# Patient Record
Sex: FEMALE | Race: WHITE | NOT HISPANIC OR LATINO | Employment: OTHER | URBAN - METROPOLITAN AREA
[De-identification: names, ages, dates, MRNs, and addresses within clinical notes are randomized per-mention and may not be internally consistent; named-entity substitution may affect disease eponyms.]

---

## 2017-05-17 ENCOUNTER — APPOINTMENT (OUTPATIENT)
Dept: GENERAL RADIOLOGY | Facility: HOSPITAL | Age: 77
End: 2017-05-17

## 2017-05-17 ENCOUNTER — HOSPITAL ENCOUNTER (OUTPATIENT)
Facility: HOSPITAL | Age: 77
Setting detail: OBSERVATION
LOS: 1 days | Discharge: HOME | End: 2017-05-18
Attending: EMERGENCY MEDICINE | Admitting: INTERNAL MEDICINE

## 2017-05-17 DIAGNOSIS — R07.9 CHEST PAIN, UNSPECIFIED TYPE: Primary | ICD-10-CM

## 2017-05-17 PROBLEM — E11.9 DIABETES MELLITUS TYPE 2, CONTROLLED (HCC): Status: ACTIVE | Noted: 2017-05-17

## 2017-05-17 PROBLEM — I10 ACCELERATED HYPERTENSION: Status: ACTIVE | Noted: 2017-05-17

## 2017-05-17 PROBLEM — E78.5 HYPERLIPIDEMIA: Status: ACTIVE | Noted: 2017-05-17

## 2017-05-17 PROBLEM — I10 HYPERTENSION: Status: ACTIVE | Noted: 2017-05-17

## 2017-05-17 LAB
ALBUMIN SERPL-MCNC: 4.8 G/DL (ref 3.2–4.8)
ALBUMIN/GLOB SERPL: 1.8 G/DL (ref 1.5–2.5)
ALP SERPL-CCNC: 147 U/L (ref 25–100)
ALT SERPL W P-5'-P-CCNC: 14 U/L (ref 7–40)
ANION GAP SERPL CALCULATED.3IONS-SCNC: 5 MMOL/L (ref 3–11)
AST SERPL-CCNC: 23 U/L (ref 0–33)
BASOPHILS # BLD AUTO: 0.02 10*3/MM3 (ref 0–0.2)
BASOPHILS NFR BLD AUTO: 0.2 % (ref 0–1)
BILIRUB SERPL-MCNC: 0.4 MG/DL (ref 0.3–1.2)
BNP SERPL-MCNC: 84 PG/ML (ref 0–100)
BUN BLD-MCNC: 14 MG/DL (ref 9–23)
BUN/CREAT SERPL: 23.3 (ref 7–25)
CALCIUM SPEC-SCNC: 10.3 MG/DL (ref 8.7–10.4)
CHLORIDE SERPL-SCNC: 106 MMOL/L (ref 99–109)
CO2 SERPL-SCNC: 28 MMOL/L (ref 20–31)
CREAT BLD-MCNC: 0.6 MG/DL (ref 0.6–1.3)
DEPRECATED RDW RBC AUTO: 43.7 FL (ref 37–54)
EOSINOPHIL # BLD AUTO: 0.08 10*3/MM3 (ref 0.1–0.3)
EOSINOPHIL NFR BLD AUTO: 1 % (ref 0–3)
ERYTHROCYTE [DISTWIDTH] IN BLOOD BY AUTOMATED COUNT: 13 % (ref 11.3–14.5)
GFR SERPL CREATININE-BSD FRML MDRD: 97 ML/MIN/1.73
GLOBULIN UR ELPH-MCNC: 2.7 GM/DL
GLUCOSE BLD-MCNC: 152 MG/DL (ref 70–100)
GLUCOSE BLDC GLUCOMTR-MCNC: 191 MG/DL (ref 70–130)
HCT VFR BLD AUTO: 43.8 % (ref 34.5–44)
HGB BLD-MCNC: 14.5 G/DL (ref 11.5–15.5)
HOLD SPECIMEN: NORMAL
HOLD SPECIMEN: NORMAL
IMM GRANULOCYTES # BLD: 0.01 10*3/MM3 (ref 0–0.03)
IMM GRANULOCYTES NFR BLD: 0.1 % (ref 0–0.6)
LIPASE SERPL-CCNC: 44 U/L (ref 6–51)
LYMPHOCYTES # BLD AUTO: 2.27 10*3/MM3 (ref 0.6–4.8)
LYMPHOCYTES NFR BLD AUTO: 28.1 % (ref 24–44)
MCH RBC QN AUTO: 30.4 PG (ref 27–31)
MCHC RBC AUTO-ENTMCNC: 33.1 G/DL (ref 32–36)
MCV RBC AUTO: 91.8 FL (ref 80–99)
MONOCYTES # BLD AUTO: 0.31 10*3/MM3 (ref 0–1)
MONOCYTES NFR BLD AUTO: 3.8 % (ref 0–12)
NEUTROPHILS # BLD AUTO: 5.38 10*3/MM3 (ref 1.5–8.3)
NEUTROPHILS NFR BLD AUTO: 66.8 % (ref 41–71)
PLATELET # BLD AUTO: 259 10*3/MM3 (ref 150–450)
PMV BLD AUTO: 10.8 FL (ref 6–12)
POTASSIUM BLD-SCNC: 4.1 MMOL/L (ref 3.5–5.5)
PROT SERPL-MCNC: 7.5 G/DL (ref 5.7–8.2)
RBC # BLD AUTO: 4.77 10*6/MM3 (ref 3.89–5.14)
SODIUM BLD-SCNC: 139 MMOL/L (ref 132–146)
TROPONIN I SERPL-MCNC: 0 NG/ML (ref 0–0.07)
WBC NRBC COR # BLD: 8.07 10*3/MM3 (ref 3.5–10.8)
WHOLE BLOOD HOLD SPECIMEN: NORMAL
WHOLE BLOOD HOLD SPECIMEN: NORMAL

## 2017-05-17 PROCEDURE — 99220 PR INITIAL OBSERVATION CARE/DAY 70 MINUTES: CPT | Performed by: HOSPITALIST

## 2017-05-17 PROCEDURE — 93005 ELECTROCARDIOGRAM TRACING: CPT

## 2017-05-17 PROCEDURE — 82962 GLUCOSE BLOOD TEST: CPT

## 2017-05-17 PROCEDURE — G0378 HOSPITAL OBSERVATION PER HR: HCPCS

## 2017-05-17 PROCEDURE — 80053 COMPREHEN METABOLIC PANEL: CPT | Performed by: EMERGENCY MEDICINE

## 2017-05-17 PROCEDURE — 83690 ASSAY OF LIPASE: CPT | Performed by: EMERGENCY MEDICINE

## 2017-05-17 PROCEDURE — 99285 EMERGENCY DEPT VISIT HI MDM: CPT

## 2017-05-17 PROCEDURE — 93005 ELECTROCARDIOGRAM TRACING: CPT | Performed by: HOSPITALIST

## 2017-05-17 PROCEDURE — 71010 HC CHEST PA OR AP: CPT

## 2017-05-17 PROCEDURE — 84484 ASSAY OF TROPONIN QUANT: CPT | Performed by: PHYSICIAN ASSISTANT

## 2017-05-17 PROCEDURE — 85025 COMPLETE CBC W/AUTO DIFF WBC: CPT | Performed by: EMERGENCY MEDICINE

## 2017-05-17 PROCEDURE — 84484 ASSAY OF TROPONIN QUANT: CPT

## 2017-05-17 PROCEDURE — 83880 ASSAY OF NATRIURETIC PEPTIDE: CPT | Performed by: EMERGENCY MEDICINE

## 2017-05-17 PROCEDURE — 63710000001 INSULIN LISPRO (HUMAN) PER 5 UNITS: Performed by: PHYSICIAN ASSISTANT

## 2017-05-17 RX ORDER — DOCUSATE SODIUM 100 MG/1
100 CAPSULE, LIQUID FILLED ORAL 2 TIMES DAILY
Status: DISCONTINUED | OUTPATIENT
Start: 2017-05-18 | End: 2017-05-18 | Stop reason: HOSPADM

## 2017-05-17 RX ORDER — SODIUM CHLORIDE 0.9 % (FLUSH) 0.9 %
1-10 SYRINGE (ML) INJECTION AS NEEDED
Status: DISCONTINUED | OUTPATIENT
Start: 2017-05-17 | End: 2017-05-18 | Stop reason: HOSPADM

## 2017-05-17 RX ORDER — AMOXICILLIN 500 MG
1200 CAPSULE ORAL DAILY
COMMUNITY

## 2017-05-17 RX ORDER — SODIUM CHLORIDE 0.9 % (FLUSH) 0.9 %
10 SYRINGE (ML) INJECTION AS NEEDED
Status: DISCONTINUED | OUTPATIENT
Start: 2017-05-17 | End: 2017-05-18 | Stop reason: HOSPADM

## 2017-05-17 RX ORDER — GLYBURIDE 1.25 MG/1
1.25 TABLET ORAL 2 TIMES DAILY WITH MEALS
COMMUNITY

## 2017-05-17 RX ORDER — NICOTINE POLACRILEX 4 MG
15 LOZENGE BUCCAL
Status: DISCONTINUED | OUTPATIENT
Start: 2017-05-17 | End: 2017-05-18 | Stop reason: HOSPADM

## 2017-05-17 RX ORDER — ASPIRIN 325 MG
162 TABLET ORAL ONCE
Status: COMPLETED | OUTPATIENT
Start: 2017-05-17 | End: 2017-05-17

## 2017-05-17 RX ORDER — PRAVASTATIN SODIUM 40 MG
80 TABLET ORAL NIGHTLY
Status: DISCONTINUED | OUTPATIENT
Start: 2017-05-17 | End: 2017-05-18 | Stop reason: HOSPADM

## 2017-05-17 RX ORDER — CARVEDILOL 3.12 MG/1
3.12 TABLET ORAL 2 TIMES DAILY
Status: DISCONTINUED | OUTPATIENT
Start: 2017-05-17 | End: 2017-05-18 | Stop reason: HOSPADM

## 2017-05-17 RX ORDER — ONDANSETRON 2 MG/ML
4 INJECTION INTRAMUSCULAR; INTRAVENOUS EVERY 6 HOURS PRN
Status: DISCONTINUED | OUTPATIENT
Start: 2017-05-17 | End: 2017-05-18 | Stop reason: HOSPADM

## 2017-05-17 RX ORDER — ASPIRIN 81 MG/1
324 TABLET, CHEWABLE ORAL ONCE
Status: DISCONTINUED | OUTPATIENT
Start: 2017-05-17 | End: 2017-05-18 | Stop reason: HOSPADM

## 2017-05-17 RX ORDER — ASPIRIN 81 MG/1
324 TABLET, CHEWABLE ORAL ONCE
Status: DISCONTINUED | OUTPATIENT
Start: 2017-05-17 | End: 2017-05-17

## 2017-05-17 RX ORDER — LISINOPRIL 2.5 MG/1
2.5 TABLET ORAL DAILY
COMMUNITY

## 2017-05-17 RX ORDER — ACETAMINOPHEN 325 MG/1
650 TABLET ORAL EVERY 4 HOURS PRN
Status: DISCONTINUED | OUTPATIENT
Start: 2017-05-17 | End: 2017-05-18 | Stop reason: HOSPADM

## 2017-05-17 RX ORDER — HEPARIN SODIUM 5000 [USP'U]/ML
5000 INJECTION, SOLUTION INTRAVENOUS; SUBCUTANEOUS EVERY 8 HOURS SCHEDULED
Status: DISCONTINUED | OUTPATIENT
Start: 2017-05-18 | End: 2017-05-18 | Stop reason: HOSPADM

## 2017-05-17 RX ORDER — ASPIRIN 81 MG/1
81 TABLET ORAL DAILY
COMMUNITY

## 2017-05-17 RX ORDER — DEXTROSE MONOHYDRATE 25 G/50ML
25 INJECTION, SOLUTION INTRAVENOUS
Status: DISCONTINUED | OUTPATIENT
Start: 2017-05-17 | End: 2017-05-18 | Stop reason: HOSPADM

## 2017-05-17 RX ORDER — ONDANSETRON 4 MG/1
4 TABLET, FILM COATED ORAL EVERY 6 HOURS PRN
Status: DISCONTINUED | OUTPATIENT
Start: 2017-05-17 | End: 2017-05-18 | Stop reason: HOSPADM

## 2017-05-17 RX ORDER — ASPIRIN 81 MG/1
81 TABLET ORAL DAILY
Status: DISCONTINUED | OUTPATIENT
Start: 2017-05-18 | End: 2017-05-18 | Stop reason: HOSPADM

## 2017-05-17 RX ORDER — MECLIZINE HYDROCHLORIDE 25 MG/1
25 TABLET ORAL 3 TIMES DAILY PRN
COMMUNITY

## 2017-05-17 RX ADMIN — ASPIRIN 325 MG ORAL TABLET 162 MG: 325 PILL ORAL at 19:24

## 2017-05-17 RX ADMIN — PRAVASTATIN SODIUM 80 MG: 40 TABLET ORAL at 22:41

## 2017-05-17 RX ADMIN — INSULIN LISPRO 2 UNITS: 100 INJECTION, SOLUTION INTRAVENOUS; SUBCUTANEOUS at 22:41

## 2017-05-17 RX ADMIN — CARVEDILOL 3.12 MG: 3.12 TABLET, FILM COATED ORAL at 22:41

## 2017-05-18 ENCOUNTER — APPOINTMENT (OUTPATIENT)
Dept: CARDIOLOGY | Facility: HOSPITAL | Age: 77
End: 2017-05-18

## 2017-05-18 VITALS
DIASTOLIC BLOOD PRESSURE: 66 MMHG | OXYGEN SATURATION: 95 % | BODY MASS INDEX: 25.87 KG/M2 | HEIGHT: 63 IN | SYSTOLIC BLOOD PRESSURE: 139 MMHG | TEMPERATURE: 97.7 F | WEIGHT: 146 LBS | RESPIRATION RATE: 15 BRPM | HEART RATE: 79 BPM

## 2017-05-18 PROBLEM — I10 ACCELERATED HYPERTENSION: Status: RESOLVED | Noted: 2017-05-17 | Resolved: 2017-05-18

## 2017-05-18 PROBLEM — R07.9 CHEST PAIN: Status: RESOLVED | Noted: 2017-05-17 | Resolved: 2017-05-18

## 2017-05-18 LAB
ANION GAP SERPL CALCULATED.3IONS-SCNC: 5 MMOL/L (ref 3–11)
ARTICHOKE IGE QN: 116 MG/DL (ref 0–130)
BH CV STRESS BP STAGE 1: NORMAL
BH CV STRESS BP STAGE 2: NORMAL
BH CV STRESS DURATION MIN STAGE 1: 3
BH CV STRESS DURATION MIN STAGE 2: 1
BH CV STRESS DURATION SEC STAGE 1: 0
BH CV STRESS DURATION SEC STAGE 2: 31
BH CV STRESS GRADE STAGE 1: 10
BH CV STRESS GRADE STAGE 2: 12
BH CV STRESS HR STAGE 1: 125
BH CV STRESS HR STAGE 2: 127
BH CV STRESS METS STAGE 1: 5
BH CV STRESS METS STAGE 2: 7.5
BH CV STRESS PROTOCOL 1: NORMAL
BH CV STRESS RECOVERY BP: NORMAL MMHG
BH CV STRESS RECOVERY HR: 75 BPM
BH CV STRESS SPEED STAGE 1: 1.7
BH CV STRESS SPEED STAGE 2: 2.5
BH CV STRESS STAGE 1: 1
BH CV STRESS STAGE 2: 2
BUN BLD-MCNC: 12 MG/DL (ref 9–23)
BUN/CREAT SERPL: 24 (ref 7–25)
CALCIUM SPEC-SCNC: 9.8 MG/DL (ref 8.7–10.4)
CHLORIDE SERPL-SCNC: 109 MMOL/L (ref 99–109)
CHOLEST SERPL-MCNC: 175 MG/DL (ref 0–200)
CO2 SERPL-SCNC: 28 MMOL/L (ref 20–31)
CREAT BLD-MCNC: 0.5 MG/DL (ref 0.6–1.3)
DEPRECATED RDW RBC AUTO: 43.9 FL (ref 37–54)
ERYTHROCYTE [DISTWIDTH] IN BLOOD BY AUTOMATED COUNT: 13 % (ref 11.3–14.5)
GFR SERPL CREATININE-BSD FRML MDRD: 120 ML/MIN/1.73
GLUCOSE BLD-MCNC: 110 MG/DL (ref 70–100)
GLUCOSE BLDC GLUCOMTR-MCNC: 122 MG/DL (ref 70–130)
GLUCOSE BLDC GLUCOMTR-MCNC: 198 MG/DL (ref 70–130)
GLUCOSE BLDC GLUCOMTR-MCNC: 99 MG/DL (ref 70–130)
HCT VFR BLD AUTO: 39.7 % (ref 34.5–44)
HDLC SERPL-MCNC: 31 MG/DL (ref 40–60)
HGB BLD-MCNC: 13 G/DL (ref 11.5–15.5)
LV EF NUC BP: 94 %
MAXIMAL PREDICTED HEART RATE: 144 BPM
MCH RBC QN AUTO: 30.2 PG (ref 27–31)
MCHC RBC AUTO-ENTMCNC: 32.7 G/DL (ref 32–36)
MCV RBC AUTO: 92.1 FL (ref 80–99)
PERCENT MAX PREDICTED HR: 89.58 %
PLATELET # BLD AUTO: 228 10*3/MM3 (ref 150–450)
PMV BLD AUTO: 10.7 FL (ref 6–12)
POTASSIUM BLD-SCNC: 3.6 MMOL/L (ref 3.5–5.5)
RBC # BLD AUTO: 4.31 10*6/MM3 (ref 3.89–5.14)
SODIUM BLD-SCNC: 142 MMOL/L (ref 132–146)
STRESS BASELINE BP: NORMAL MMHG
STRESS BASELINE HR: 71 BPM
STRESS PERCENT HR: 105 %
STRESS POST ESTIMATED WORKLOAD: 6.4 METS
STRESS POST EXERCISE DUR MIN: 4 MIN
STRESS POST EXERCISE DUR SEC: 31 SEC
STRESS POST PEAK BP: NORMAL MMHG
STRESS POST PEAK HR: 129 BPM
STRESS TARGET HR: 122 BPM
TRIGL SERPL-MCNC: 185 MG/DL (ref 0–150)
TROPONIN I SERPL-MCNC: <0.006 NG/ML
TROPONIN I SERPL-MCNC: <0.006 NG/ML
WBC NRBC COR # BLD: 6.32 10*3/MM3 (ref 3.5–10.8)

## 2017-05-18 PROCEDURE — 82962 GLUCOSE BLOOD TEST: CPT

## 2017-05-18 PROCEDURE — A9500 TC99M SESTAMIBI: HCPCS | Performed by: INTERNAL MEDICINE

## 2017-05-18 PROCEDURE — 93018 CV STRESS TEST I&R ONLY: CPT | Performed by: INTERNAL MEDICINE

## 2017-05-18 PROCEDURE — 93010 ELECTROCARDIOGRAM REPORT: CPT | Performed by: INTERNAL MEDICINE

## 2017-05-18 PROCEDURE — G0378 HOSPITAL OBSERVATION PER HR: HCPCS

## 2017-05-18 PROCEDURE — 84484 ASSAY OF TROPONIN QUANT: CPT | Performed by: PHYSICIAN ASSISTANT

## 2017-05-18 PROCEDURE — 80061 LIPID PANEL: CPT | Performed by: PHYSICIAN ASSISTANT

## 2017-05-18 PROCEDURE — 93005 ELECTROCARDIOGRAM TRACING: CPT | Performed by: PHYSICIAN ASSISTANT

## 2017-05-18 PROCEDURE — 85027 COMPLETE CBC AUTOMATED: CPT | Performed by: PHYSICIAN ASSISTANT

## 2017-05-18 PROCEDURE — 78452 HT MUSCLE IMAGE SPECT MULT: CPT

## 2017-05-18 PROCEDURE — 78452 HT MUSCLE IMAGE SPECT MULT: CPT | Performed by: INTERNAL MEDICINE

## 2017-05-18 PROCEDURE — 80048 BASIC METABOLIC PNL TOTAL CA: CPT | Performed by: PHYSICIAN ASSISTANT

## 2017-05-18 PROCEDURE — 25010000002 HEPARIN (PORCINE) PER 1000 UNITS: Performed by: PHYSICIAN ASSISTANT

## 2017-05-18 PROCEDURE — 96372 THER/PROPH/DIAG INJ SC/IM: CPT

## 2017-05-18 PROCEDURE — 0 TECHNETIUM SESTAMIBI: Performed by: INTERNAL MEDICINE

## 2017-05-18 PROCEDURE — 93017 CV STRESS TEST TRACING ONLY: CPT

## 2017-05-18 PROCEDURE — 99217 PR OBSERVATION CARE DISCHARGE MANAGEMENT: CPT | Performed by: INTERNAL MEDICINE

## 2017-05-18 RX ORDER — ATORVASTATIN CALCIUM 10 MG/1
10 TABLET, FILM COATED ORAL DAILY
Qty: 30 TABLET | Refills: 0 | Status: SHIPPED | OUTPATIENT
Start: 2017-05-18

## 2017-05-18 RX ADMIN — Medication 1 DOSE: at 07:40

## 2017-05-18 RX ADMIN — Medication 1 DOSE: at 09:40

## 2017-05-18 RX ADMIN — CARVEDILOL 3.12 MG: 3.12 TABLET, FILM COATED ORAL at 12:35

## 2017-05-18 RX ADMIN — ASPIRIN 81 MG: 81 TABLET, COATED ORAL at 12:35

## 2017-05-18 RX ADMIN — HEPARIN SODIUM 5000 UNITS: 5000 INJECTION, SOLUTION INTRAVENOUS; SUBCUTANEOUS at 06:28

## 2017-05-18 RX ADMIN — HEPARIN SODIUM 5000 UNITS: 5000 INJECTION, SOLUTION INTRAVENOUS; SUBCUTANEOUS at 17:21

## 2017-05-18 RX ADMIN — INSULIN LISPRO 2 UNITS: 100 INJECTION, SOLUTION INTRAVENOUS; SUBCUTANEOUS at 12:40

## 2018-11-19 ENCOUNTER — HOSPITAL ENCOUNTER (EMERGENCY)
Facility: HOSPITAL | Age: 78
Discharge: HOME | End: 2018-11-19
Attending: EMERGENCY MEDICINE | Admitting: EMERGENCY MEDICINE

## 2018-11-19 VITALS
SYSTOLIC BLOOD PRESSURE: 131 MMHG | TEMPERATURE: 97.9 F | WEIGHT: 143 LBS | BODY MASS INDEX: 25.34 KG/M2 | HEIGHT: 63 IN | HEART RATE: 79 BPM | DIASTOLIC BLOOD PRESSURE: 63 MMHG | RESPIRATION RATE: 18 BRPM | OXYGEN SATURATION: 95 %

## 2018-11-19 DIAGNOSIS — T78.40XA ALLERGIC REACTION, INITIAL ENCOUNTER: Primary | ICD-10-CM

## 2018-11-19 LAB
BACTERIA UR QL AUTO: NORMAL /HPF
BILIRUB UR QL STRIP: NEGATIVE
CLARITY UR: CLEAR
COLOR UR: YELLOW
GLUCOSE UR STRIP-MCNC: NEGATIVE MG/DL
HGB UR QL STRIP.AUTO: NEGATIVE
HYALINE CASTS UR QL AUTO: NORMAL /LPF
KETONES UR QL STRIP: NEGATIVE
LEUKOCYTE ESTERASE UR QL STRIP.AUTO: ABNORMAL
NITRITE UR QL STRIP: NEGATIVE
PH UR STRIP.AUTO: 5.5 [PH] (ref 5–8)
PROT UR QL STRIP: NEGATIVE
RBC # UR: NORMAL /HPF
REF LAB TEST METHOD: NORMAL
SP GR UR STRIP: 1.01 (ref 1–1.03)
SQUAMOUS #/AREA URNS HPF: NORMAL /HPF
UROBILINOGEN UR QL STRIP: ABNORMAL
WBC UR QL AUTO: NORMAL /HPF

## 2018-11-19 PROCEDURE — 25010000002 METHYLPREDNISOLONE PER 125 MG: Performed by: EMERGENCY MEDICINE

## 2018-11-19 PROCEDURE — 81001 URINALYSIS AUTO W/SCOPE: CPT | Performed by: EMERGENCY MEDICINE

## 2018-11-19 PROCEDURE — 99283 EMERGENCY DEPT VISIT LOW MDM: CPT

## 2018-11-19 PROCEDURE — 63710000001 DIPHENHYDRAMINE PER 50 MG: Performed by: EMERGENCY MEDICINE

## 2018-11-19 PROCEDURE — 96374 THER/PROPH/DIAG INJ IV PUSH: CPT

## 2018-11-19 RX ORDER — DIPHENHYDRAMINE HCL 50 MG
50 CAPSULE ORAL ONCE
Status: COMPLETED | OUTPATIENT
Start: 2018-11-19 | End: 2018-11-19

## 2018-11-19 RX ORDER — PREDNISONE 20 MG/1
40 TABLET ORAL DAILY
Qty: 10 TABLET | Refills: 0 | Status: SHIPPED | OUTPATIENT
Start: 2018-11-19 | End: 2018-11-24

## 2018-11-19 RX ORDER — DIPHENHYDRAMINE HCL 25 MG
25 TABLET ORAL EVERY 6 HOURS PRN
Qty: 20 TABLET | Refills: 0 | Status: SHIPPED | OUTPATIENT
Start: 2018-11-19

## 2018-11-19 RX ORDER — METHYLPREDNISOLONE SODIUM SUCCINATE 125 MG/2ML
125 INJECTION, POWDER, LYOPHILIZED, FOR SOLUTION INTRAMUSCULAR; INTRAVENOUS ONCE
Status: COMPLETED | OUTPATIENT
Start: 2018-11-19 | End: 2018-11-19

## 2018-11-19 RX ORDER — FAMOTIDINE 20 MG/1
20 TABLET, FILM COATED ORAL 2 TIMES DAILY
Qty: 10 TABLET | Refills: 0 | Status: SHIPPED | OUTPATIENT
Start: 2018-11-19 | End: 2018-11-24

## 2018-11-19 RX ADMIN — DIPHENHYDRAMINE HYDROCHLORIDE 50 MG: 50 CAPSULE ORAL at 05:00

## 2018-11-19 RX ADMIN — METHYLPREDNISOLONE SODIUM SUCCINATE 125 MG: 125 INJECTION, POWDER, FOR SOLUTION INTRAMUSCULAR; INTRAVENOUS at 04:59

## 2018-11-19 NOTE — DISCHARGE INSTRUCTIONS
Discontinue use of Azithromycin.  Your UTI has been successfully treated.  Take new medications as prescribed.  Return to the Emergency Department if symptoms worsen or other concerns arise.

## 2021-02-12 ENCOUNTER — IMMUNIZATION (OUTPATIENT)
Dept: VACCINE CLINIC | Facility: HOSPITAL | Age: 81
End: 2021-02-12

## 2021-02-12 PROCEDURE — 91301 HC SARSCO02 VAC 100MCG/0.5ML IM: CPT | Performed by: INTERNAL MEDICINE

## 2021-02-12 PROCEDURE — 0011A: CPT | Performed by: INTERNAL MEDICINE

## 2021-03-12 ENCOUNTER — IMMUNIZATION (OUTPATIENT)
Dept: VACCINE CLINIC | Facility: HOSPITAL | Age: 81
End: 2021-03-12

## 2021-03-12 PROCEDURE — 91301 HC SARSCO02 VAC 100MCG/0.5ML IM: CPT | Performed by: INTERNAL MEDICINE

## 2021-03-12 PROCEDURE — 0012A: CPT | Performed by: INTERNAL MEDICINE

## 2023-04-12 RX ORDER — CHOLECALCIFEROL (VITAMIN D3) 125 MCG
5 CAPSULE ORAL NIGHTLY
COMMUNITY

## 2023-04-12 RX ORDER — MULTIPLE VITAMINS W/ MINERALS TAB 9MG-400MCG
1 TAB ORAL DAILY
COMMUNITY

## 2023-04-12 RX ORDER — DILTIAZEM HYDROCHLORIDE 360 MG/1
360 CAPSULE, EXTENDED RELEASE ORAL DAILY
Status: ON HOLD | COMMUNITY
End: 2023-04-17

## 2023-04-12 RX ORDER — FLUTICASONE PROPIONATE 50 MCG
2 SPRAY, SUSPENSION (ML) NASAL DAILY
COMMUNITY

## 2023-04-16 ENCOUNTER — ANESTHESIA EVENT (OUTPATIENT)
Dept: PERIOP | Facility: HOSPITAL | Age: 83
End: 2023-04-16
Payer: MEDICARE

## 2023-04-16 RX ORDER — SODIUM CHLORIDE 0.9 % (FLUSH) 0.9 %
10 SYRINGE (ML) INJECTION AS NEEDED
Status: CANCELLED | OUTPATIENT
Start: 2023-04-16

## 2023-04-16 RX ORDER — FAMOTIDINE 10 MG/ML
20 INJECTION, SOLUTION INTRAVENOUS ONCE
Status: CANCELLED | OUTPATIENT
Start: 2023-04-16 | End: 2023-04-16

## 2023-04-16 RX ORDER — SODIUM CHLORIDE 9 MG/ML
40 INJECTION, SOLUTION INTRAVENOUS AS NEEDED
Status: CANCELLED | OUTPATIENT
Start: 2023-04-16

## 2023-04-16 RX ORDER — SODIUM CHLORIDE 0.9 % (FLUSH) 0.9 %
10 SYRINGE (ML) INJECTION EVERY 12 HOURS SCHEDULED
Status: CANCELLED | OUTPATIENT
Start: 2023-04-16

## 2023-04-17 ENCOUNTER — HOSPITAL ENCOUNTER (OUTPATIENT)
Facility: HOSPITAL | Age: 83
Setting detail: HOSPITAL OUTPATIENT SURGERY
Discharge: HOME | End: 2023-04-17
Attending: UROLOGY | Admitting: UROLOGY
Payer: MEDICARE

## 2023-04-17 ENCOUNTER — ANESTHESIA (OUTPATIENT)
Dept: PERIOP | Facility: HOSPITAL | Age: 83
End: 2023-04-17
Payer: MEDICARE

## 2023-04-17 ENCOUNTER — APPOINTMENT (OUTPATIENT)
Dept: GENERAL RADIOLOGY | Facility: HOSPITAL | Age: 83
End: 2023-04-17
Payer: MEDICARE

## 2023-04-17 VITALS
BODY MASS INDEX: 24.45 KG/M2 | HEIGHT: 63 IN | SYSTOLIC BLOOD PRESSURE: 109 MMHG | TEMPERATURE: 97.6 F | RESPIRATION RATE: 16 BRPM | WEIGHT: 138 LBS | OXYGEN SATURATION: 94 % | HEART RATE: 110 BPM | DIASTOLIC BLOOD PRESSURE: 72 MMHG

## 2023-04-17 LAB
GLUCOSE BLDC GLUCOMTR-MCNC: 136 MG/DL (ref 70–130)
GLUCOSE BLDC GLUCOMTR-MCNC: 169 MG/DL (ref 70–130)

## 2023-04-17 PROCEDURE — C2617 STENT, NON-COR, TEM W/O DEL: HCPCS | Performed by: UROLOGY

## 2023-04-17 PROCEDURE — 82962 GLUCOSE BLOOD TEST: CPT

## 2023-04-17 PROCEDURE — 25010000002 FENTANYL CITRATE (PF) 100 MCG/2ML SOLUTION

## 2023-04-17 PROCEDURE — 25010000002 HYDROMORPHONE 1 MG/ML SOLUTION

## 2023-04-17 PROCEDURE — 76000 FLUOROSCOPY <1 HR PHYS/QHP: CPT

## 2023-04-17 PROCEDURE — C1769 GUIDE WIRE: HCPCS | Performed by: UROLOGY

## 2023-04-17 PROCEDURE — 25010000002 DROPERIDOL PER 5 MG

## 2023-04-17 PROCEDURE — 25010000002 ONDANSETRON PER 1 MG

## 2023-04-17 PROCEDURE — 25010000002 DEXAMETHASONE PER 1 MG

## 2023-04-17 PROCEDURE — 25010000002 PROPOFOL 10 MG/ML EMULSION

## 2023-04-17 DEVICE — URETERAL STENT
Type: IMPLANTABLE DEVICE | Site: URETER | Status: FUNCTIONAL
Brand: PERCUFLEX™ PLUS

## 2023-04-17 RX ORDER — LIDOCAINE HYDROCHLORIDE 20 MG/ML
JELLY TOPICAL AS NEEDED
Status: DISCONTINUED | OUTPATIENT
Start: 2023-04-17 | End: 2023-04-17 | Stop reason: HOSPADM

## 2023-04-17 RX ORDER — AZTREONAM 1 G/1
1 INJECTION, POWDER, LYOPHILIZED, FOR SOLUTION INTRAMUSCULAR; INTRAVENOUS ONCE
Status: DISCONTINUED | OUTPATIENT
Start: 2023-04-17 | End: 2023-04-17

## 2023-04-17 RX ORDER — FENTANYL CITRATE 50 UG/ML
50 INJECTION, SOLUTION INTRAMUSCULAR; INTRAVENOUS
Status: DISCONTINUED | OUTPATIENT
Start: 2023-04-17 | End: 2023-04-17 | Stop reason: HOSPADM

## 2023-04-17 RX ORDER — SODIUM CHLORIDE 9 MG/ML
40 INJECTION, SOLUTION INTRAVENOUS AS NEEDED
Status: DISCONTINUED | OUTPATIENT
Start: 2023-04-17 | End: 2023-04-17 | Stop reason: HOSPADM

## 2023-04-17 RX ORDER — TRIAMCINOLONE ACETONIDE 1 MG/G
2 CREAM TOPICAL 2 TIMES WEEKLY
COMMUNITY

## 2023-04-17 RX ORDER — NITROFURANTOIN 25; 75 MG/1; MG/1
CAPSULE ORAL
Status: ON HOLD | COMMUNITY
Start: 2023-04-08 | End: 2023-04-17

## 2023-04-17 RX ORDER — NALOXONE HCL 0.4 MG/ML
0.4 VIAL (ML) INJECTION AS NEEDED
Status: DISCONTINUED | OUTPATIENT
Start: 2023-04-17 | End: 2023-04-17 | Stop reason: HOSPADM

## 2023-04-17 RX ORDER — FENTANYL CITRATE 50 UG/ML
INJECTION, SOLUTION INTRAMUSCULAR; INTRAVENOUS AS NEEDED
Status: DISCONTINUED | OUTPATIENT
Start: 2023-04-17 | End: 2023-04-17 | Stop reason: SURG

## 2023-04-17 RX ORDER — BUPIVACAINE HCL/0.9 % NACL/PF 0.125 %
PLASTIC BAG, INJECTION (ML) EPIDURAL AS NEEDED
Status: DISCONTINUED | OUTPATIENT
Start: 2023-04-17 | End: 2023-04-17 | Stop reason: SURG

## 2023-04-17 RX ORDER — DILTIAZEM HYDROCHLORIDE 360 MG/1
1 CAPSULE, EXTENDED RELEASE ORAL DAILY
COMMUNITY
Start: 2023-04-10

## 2023-04-17 RX ORDER — LIDOCAINE HYDROCHLORIDE 10 MG/ML
0.5 INJECTION, SOLUTION EPIDURAL; INFILTRATION; INTRACAUDAL; PERINEURAL ONCE AS NEEDED
Status: COMPLETED | OUTPATIENT
Start: 2023-04-17 | End: 2023-04-17

## 2023-04-17 RX ORDER — ONDANSETRON 2 MG/ML
INJECTION INTRAMUSCULAR; INTRAVENOUS
Status: COMPLETED
Start: 2023-04-17 | End: 2023-04-17

## 2023-04-17 RX ORDER — FLUTICASONE PROPIONATE 50 MCG
1 SPRAY, SUSPENSION (ML) NASAL DAILY
Status: ON HOLD | COMMUNITY
End: 2023-04-17

## 2023-04-17 RX ORDER — MAGNESIUM HYDROXIDE 1200 MG/15ML
LIQUID ORAL AS NEEDED
Status: DISCONTINUED | OUTPATIENT
Start: 2023-04-17 | End: 2023-04-17 | Stop reason: HOSPADM

## 2023-04-17 RX ORDER — MIDAZOLAM HYDROCHLORIDE 1 MG/ML
0.5 INJECTION INTRAMUSCULAR; INTRAVENOUS
Status: DISCONTINUED | OUTPATIENT
Start: 2023-04-17 | End: 2023-04-17 | Stop reason: HOSPADM

## 2023-04-17 RX ORDER — PROPAFENONE HYDROCHLORIDE 225 MG/1
TABLET, FILM COATED ORAL
Status: ON HOLD | COMMUNITY
Start: 2023-04-02 | End: 2023-04-17

## 2023-04-17 RX ORDER — HYDROCODONE BITARTRATE AND ACETAMINOPHEN 5; 325 MG/1; MG/1
TABLET ORAL
Status: COMPLETED
Start: 2023-04-17 | End: 2023-04-17

## 2023-04-17 RX ORDER — PSEUDOEPHEDRINE HCL 30 MG
1 TABLET ORAL 4 TIMES DAILY
COMMUNITY

## 2023-04-17 RX ORDER — SODIUM FLUORIDE 6 MG/ML
PASTE, DENTIFRICE DENTAL
COMMUNITY
Start: 2023-01-19

## 2023-04-17 RX ORDER — LIDOCAINE HYDROCHLORIDE 10 MG/ML
INJECTION, SOLUTION EPIDURAL; INFILTRATION; INTRACAUDAL; PERINEURAL AS NEEDED
Status: DISCONTINUED | OUTPATIENT
Start: 2023-04-17 | End: 2023-04-17 | Stop reason: SURG

## 2023-04-17 RX ORDER — SODIUM CHLORIDE, SODIUM LACTATE, POTASSIUM CHLORIDE, CALCIUM CHLORIDE 600; 310; 30; 20 MG/100ML; MG/100ML; MG/100ML; MG/100ML
9 INJECTION, SOLUTION INTRAVENOUS CONTINUOUS
Status: DISCONTINUED | OUTPATIENT
Start: 2023-04-17 | End: 2023-04-17 | Stop reason: HOSPADM

## 2023-04-17 RX ORDER — ASPIRIN 81 MG/1
81 TABLET ORAL DAILY
COMMUNITY

## 2023-04-17 RX ORDER — FAMOTIDINE 20 MG/1
20 TABLET, FILM COATED ORAL ONCE
Status: COMPLETED | OUTPATIENT
Start: 2023-04-17 | End: 2023-04-17

## 2023-04-17 RX ORDER — ONDANSETRON 2 MG/ML
4 INJECTION INTRAMUSCULAR; INTRAVENOUS ONCE AS NEEDED
Status: COMPLETED | OUTPATIENT
Start: 2023-04-17 | End: 2023-04-17

## 2023-04-17 RX ORDER — RIVAROXABAN 20 MG/1
TABLET, FILM COATED ORAL
COMMUNITY
Start: 2023-04-02

## 2023-04-17 RX ORDER — SODIUM CHLORIDE 0.9 % (FLUSH) 0.9 %
3 SYRINGE (ML) INJECTION EVERY 12 HOURS SCHEDULED
Status: DISCONTINUED | OUTPATIENT
Start: 2023-04-17 | End: 2023-04-17 | Stop reason: HOSPADM

## 2023-04-17 RX ORDER — IPRATROPIUM BROMIDE AND ALBUTEROL SULFATE 2.5; .5 MG/3ML; MG/3ML
3 SOLUTION RESPIRATORY (INHALATION) ONCE AS NEEDED
Status: DISCONTINUED | OUTPATIENT
Start: 2023-04-17 | End: 2023-04-17 | Stop reason: HOSPADM

## 2023-04-17 RX ORDER — HYDROCODONE BITARTRATE AND ACETAMINOPHEN 5; 325 MG/1; MG/1
1 TABLET ORAL ONCE AS NEEDED
Status: DISCONTINUED | OUTPATIENT
Start: 2023-04-17 | End: 2023-04-17 | Stop reason: HOSPADM

## 2023-04-17 RX ORDER — DROPERIDOL 2.5 MG/ML
INJECTION, SOLUTION INTRAMUSCULAR; INTRAVENOUS
Status: COMPLETED
Start: 2023-04-17 | End: 2023-04-17

## 2023-04-17 RX ORDER — SODIUM CHLORIDE 0.9 % (FLUSH) 0.9 %
3-10 SYRINGE (ML) INJECTION AS NEEDED
Status: DISCONTINUED | OUTPATIENT
Start: 2023-04-17 | End: 2023-04-17 | Stop reason: HOSPADM

## 2023-04-17 RX ORDER — DEXAMETHASONE SODIUM PHOSPHATE 4 MG/ML
INJECTION, SOLUTION INTRA-ARTICULAR; INTRALESIONAL; INTRAMUSCULAR; INTRAVENOUS; SOFT TISSUE AS NEEDED
Status: DISCONTINUED | OUTPATIENT
Start: 2023-04-17 | End: 2023-04-17 | Stop reason: SURG

## 2023-04-17 RX ORDER — DROPERIDOL 2.5 MG/ML
0.62 INJECTION, SOLUTION INTRAMUSCULAR; INTRAVENOUS ONCE
Status: COMPLETED | OUTPATIENT
Start: 2023-04-17 | End: 2023-04-17

## 2023-04-17 RX ORDER — LISINOPRIL 2.5 MG/1
2.5 TABLET ORAL
COMMUNITY

## 2023-04-17 RX ORDER — PROPOFOL 10 MG/ML
VIAL (ML) INTRAVENOUS AS NEEDED
Status: DISCONTINUED | OUTPATIENT
Start: 2023-04-17 | End: 2023-04-17 | Stop reason: SURG

## 2023-04-17 RX ORDER — MEPERIDINE HYDROCHLORIDE 25 MG/ML
12.5 INJECTION INTRAMUSCULAR; INTRAVENOUS; SUBCUTANEOUS
Status: DISCONTINUED | OUTPATIENT
Start: 2023-04-17 | End: 2023-04-17 | Stop reason: HOSPADM

## 2023-04-17 RX ORDER — CLINDAMYCIN PHOSPHATE 600 MG/50ML
600 INJECTION INTRAVENOUS
Status: COMPLETED | OUTPATIENT
Start: 2023-04-17 | End: 2023-04-17

## 2023-04-17 RX ORDER — ONDANSETRON 2 MG/ML
INJECTION INTRAMUSCULAR; INTRAVENOUS AS NEEDED
Status: DISCONTINUED | OUTPATIENT
Start: 2023-04-17 | End: 2023-04-17 | Stop reason: SURG

## 2023-04-17 RX ORDER — ONDANSETRON 4 MG/1
1 TABLET, FILM COATED ORAL 3 TIMES DAILY
COMMUNITY
Start: 2023-04-04

## 2023-04-17 RX ORDER — LABETALOL HYDROCHLORIDE 5 MG/ML
5 INJECTION, SOLUTION INTRAVENOUS
Status: DISCONTINUED | OUTPATIENT
Start: 2023-04-17 | End: 2023-04-17 | Stop reason: HOSPADM

## 2023-04-17 RX ADMIN — LIDOCAINE HYDROCHLORIDE 0.5 ML: 10 INJECTION, SOLUTION EPIDURAL; INFILTRATION; INTRACAUDAL; PERINEURAL at 12:28

## 2023-04-17 RX ADMIN — FENTANYL CITRATE 50 MCG: 50 INJECTION, SOLUTION INTRAMUSCULAR; INTRAVENOUS at 14:34

## 2023-04-17 RX ADMIN — Medication 100 MCG: at 14:44

## 2023-04-17 RX ADMIN — HYDROCODONE BITARTRATE AND ACETAMINOPHEN 1 TABLET: 5; 325 TABLET ORAL at 16:09

## 2023-04-17 RX ADMIN — ONDANSETRON 4 MG: 2 INJECTION INTRAMUSCULAR; INTRAVENOUS at 15:28

## 2023-04-17 RX ADMIN — SODIUM CHLORIDE, POTASSIUM CHLORIDE, SODIUM LACTATE AND CALCIUM CHLORIDE 9 ML/HR: 600; 310; 30; 20 INJECTION, SOLUTION INTRAVENOUS at 12:29

## 2023-04-17 RX ADMIN — AZTREONAM 1 G: 1 INJECTION, POWDER, LYOPHILIZED, FOR SOLUTION INTRAMUSCULAR; INTRAVENOUS at 13:56

## 2023-04-17 RX ADMIN — DROPERIDOL 0.62 MG: 2.5 INJECTION, SOLUTION INTRAMUSCULAR; INTRAVENOUS at 15:58

## 2023-04-17 RX ADMIN — LIDOCAINE HYDROCHLORIDE 100 MG: 10 INJECTION, SOLUTION EPIDURAL; INFILTRATION; INTRACAUDAL; PERINEURAL at 13:56

## 2023-04-17 RX ADMIN — ONDANSETRON 4 MG: 2 INJECTION INTRAMUSCULAR; INTRAVENOUS at 14:25

## 2023-04-17 RX ADMIN — PROPOFOL 150 MG: 10 INJECTION, EMULSION INTRAVENOUS at 13:56

## 2023-04-17 RX ADMIN — DEXAMETHASONE SODIUM PHOSPHATE 4 MG: 4 INJECTION, SOLUTION INTRAMUSCULAR; INTRAVENOUS at 14:01

## 2023-04-17 RX ADMIN — CLINDAMYCIN PHOSPHATE 600 MG: 600 INJECTION, SOLUTION INTRAVENOUS at 13:56

## 2023-04-17 RX ADMIN — Medication 100 MCG: at 14:02

## 2023-04-17 RX ADMIN — Medication 200 MCG: at 14:57

## 2023-04-17 RX ADMIN — FAMOTIDINE 20 MG: 20 TABLET, FILM COATED ORAL at 12:55

## 2023-04-17 RX ADMIN — HYDROMORPHONE HYDROCHLORIDE 0.5 MG: 1 INJECTION, SOLUTION INTRAMUSCULAR; INTRAVENOUS; SUBCUTANEOUS at 15:29

## 2023-04-17 RX ADMIN — FENTANYL CITRATE 50 MCG: 50 INJECTION, SOLUTION INTRAMUSCULAR; INTRAVENOUS at 14:25

## 2023-04-17 RX ADMIN — Medication 200 MCG: at 14:30

## 2023-04-17 RX ADMIN — Medication 200 MCG: at 14:04

## 2023-04-17 RX ADMIN — Medication 200 MCG: at 14:19

## 2023-04-17 NOTE — ANESTHESIA PROCEDURE NOTES
Airway  Urgency: elective    Date/Time: 4/17/2023 1:58 PM  Airway not difficult    General Information and Staff    Patient location during procedure: OR  CRNA/CAA: Cat Queen CRNA    Indications and Patient Condition  Indications for airway management: airway protection    Preoxygenated: yes  Mask difficulty assessment: 1 - vent by mask    Final Airway Details  Final airway type: supraglottic airway      Successful airway: I-gel  Size 4     Number of attempts at approach: 1  Assessment: lips, teeth, and gum same as pre-op    Additional Comments  LMA placed without difficulty, ventilation with assist, equal breath sounds and symmetric chest rise and fall

## 2023-04-17 NOTE — ANESTHESIA POSTPROCEDURE EVALUATION
Patient: Susanne Lim    Procedure Summary     Date: 04/17/23 Room / Location:  [facility] /  [facility]    Anesthesia Start: 1350 Anesthesia Stop: 1510    Procedure: RIGHT URETEROSCOPY LASER LITHOTRIPSY, STONE BASKET EXTRACTION, RIGHT URETERAL STENT PLACEMENT (Right) Diagnosis:     Surgeons: Jay Claros MD Provider: Danny Coon MD    Anesthesia Type: general ASA Status: 3          Anesthesia Type: general    Vitals  Vitals Value Taken Time   /71 04/17/23 1510   Temp 97.2 °F (36.2 °C) 04/17/23 1510   Pulse 82 04/17/23 1510   Resp 14 04/17/23 1510   SpO2 99 % 04/17/23 1510           Post Anesthesia Care and Evaluation    Patient location during evaluation: PACU  Patient participation: complete - patient participated  Level of consciousness: awake and alert  Pain management: adequate    Airway patency: patent  Anesthetic complications: No anesthetic complications  PONV Status: none  Cardiovascular status: hemodynamically stable and acceptable  Respiratory status: nonlabored ventilation, acceptable and nasal cannula  Hydration status: acceptable

## 2023-04-17 NOTE — OP NOTE
Operative note      Preoperative diagnosis: Right renal calculus  Postoperative diagnosis: Same    Procedure: Cystoscopy, right ureteroscopic laser lithotripsy, right ureteral stent placement    Anesthesia: General  Complications: None  EBL: 0      Indications-this 82-year-old female presented with recent right flank pain with findings of a 13 to 14 mm stone in right UPJ and a ureteral stone.  She has subsequently passed her ureteral stone and her pain has improved.  She now presents for elective management of her larger renal pelvic stone.      Procedure-this patient was brought to the operating room where general anesthesia was induced.  Preoperatively IV antibiotics were administered and antiembolism stockings were placed.  Extremities and all pressure points were padded and protected appropriately.  The genitalia were prepped and draped sterilely.  An appropriate timeout was conducted.  A 22 [place]araguan panendoscope was passed into the bladder and a systematic exam was carried out.  The right ureteral orifice was cannulated with a floppy tip wire which was passed up the ureter under fluoroscopic guidance beyond the visible stone in the renal pelvis.  The scope was removed and a 6.9 [place]araguan rigid ureteroscope was advanced adjacent to the wire into the renal pelvis encountering the medial aspect of the stone.  Holmium laser energy was introduced and used to fragment a large portion of the stone yet the more lateral portions of the stone were not accessible with the rigid scope.  A second wire was placed and the rigid scope was removed and exchanged for a digital flexible ureteroscope which was advanced into the renal pelvis under fluoroscopic guidance.  Laser lithotripsy was completed managing all residual stone into tiny fragments and dust.  It was assessed that all fragments were very small and deemed to be passable.  The scope was removed and a 4.8 [place]ar[place]uan by 24 cm double-J ureteral stent was positioned with curl in  renal pelvis and bladder and string attached to stent.  The bladder was drained with a panendoscope and the patient was awakened and transported to postop recovery in stable condition.  She tolerated the procedure very well there are no complications.      Findings-unremarkable bladder.  Single large stone in right renal pelvis perhaps 15 mm in size.  This fragmented completely.  All residual fragments were deemed to be very small and passable.  Stent was well-positioned with string attached.

## 2023-04-17 NOTE — H&P
Pre-Op H&P  Susanne Lim  [id number]  1940      Chief complaint: Kidney stone       Subjective:  Patient is a 82 y.o.female presents for scheduled surgery by Dr. Claros. She anticipates a RIGHT URETEROSCOPY LASER LITHOTRIPSY today. She had 2 hospitalizations on 3/30/23 and 4/6/23 for new onset afib RVR and then hyponatremia r/t Rythmol. On 4/1/23 she had CT abd and was found to have Extensive right renal stone disease with mild right hydronephrosis and largest stone in right renal pelvis. She has some flank pain and had gross hematuria while Xarelto. She denies any hematuria since stopping Xarelto in preparation for surgery.      Review of Systems:  Constitutional-- No fever, chills or sweats. No fatigue.  CV-- No chest pain, palpitation or syncope. +HTN, HLD, afib   Resp-- No SOB, cough, hemoptysis  Skin--No rashes or lesions      Allergies:   Allergies   Allergen Reactions   • Azithromycin Shortness Of Breath   • Contrast Dye (Echo Or Unknown Ct/Mr) Hives and Shortness Of Breath   • Iodinated Contrast Media Anaphylaxis   • Propafenone Other (See Comments)     Lowered sodium     • Gluten Meal Unknown - Low Severity   • Cefdinir GI Intolerance   • Ciprofloxacin Rash   • Covid-19 Mrna Vacc (Moderna) Myalgia   • Doxycycline Rash   • Influenza Vaccines Swelling, Mental Status Change and Dizziness   • Levofloxacin Rash   • Sulfa Antibiotics Rash         Home Meds:  Medications Prior to Admission   Medication Sig Dispense Refill Last Dose   • Calcium Carb-Cholecalciferol 600-20 MG-MCG chewable tablet Chew 1 tablet Daily.   4/16/2023   • dilTIAZem CD (CARDIZEM CD) 360 MG 24 hr capsule Take 1 capsule by mouth Daily.   4/17/2023 at 0900   • docusate sodium 100 MG capsule Apply 1 tablet to the mouth or throat 4 (Four) Times a Day.   4/16/2023   • fluticasone (FLONASE) 50 MCG/ACT nasal spray 2 sprays into the nostril(s) as directed by provider Daily.   4/16/2023   • Glucosamine-Chondroitin (MOVE FREE PO) Take  by  "mouth.   4/16/2023   • glyBURIDE (DIAbeta) 1.25 MG tablet Take 1 tablet by mouth 2 (Two) Times a Day With Meals.   4/16/2023   • lisinopril (PRINIVIL,ZESTRIL) 2.5 MG tablet Apply 1 tablet to the mouth or throat.   4/16/2023   • magnesium oxide 250 MG tablet Take 1 tablet by mouth.   4/16/2023   • melatonin 5 MG tablet tablet Take 1 tablet by mouth Every Night.   4/16/2023   • multivitamin with minerals tablet tablet Take 1 tablet by mouth Daily.   4/16/2023   • Omega-3 Fatty Acids (FISH OIL) 1200 MG capsule capsule Take 1 capsule by mouth Daily.   4/16/2023   • ondansetron (ZOFRAN) 4 MG tablet Take 1 tablet by mouth 3 (Three) Times a Day.   Past Week   • triamcinolone (KENALOG) 0.1 % cream Insert 2 application into the vagina 2 (Two) Times a Week.   4/16/2023   • aspirin 81 MG EC tablet Take 1 tablet by mouth Daily.   4/10/2023   • meclizine (ANTIVERT) 25 MG tablet Take 1 tablet by mouth 3 (Three) Times a Day As Needed for Dizziness.   More than a month   • Multiple Vitamin (MULTI-VITAMIN PO) Take 0.5 tablets by mouth Daily.   More than a month   • PreviDent 5000 Booster Plus 1.1 % paste APPLY A THIN RIBBON TO BRUSH. BRUSH FOR 2 MINUTES AT BEDTIME. SPIT, BUT DO NOT RINSE.   Unknown   • Xarelto 20 MG tablet TAKE 1 TABLET (20 MG TOTAL) BY MOUTH IN THE MORNING   4/14/2023         PMH:   Past Medical History:   Diagnosis Date   • Adverse reaction to COVID-19 vaccine     1st and 2nd dose caused myalgia; 3rd dose caused \"gas around heart\"; increased belching   • AF (atrial fibrillation) 04/2023    recent hospitalization at [facility]   • Arthritis     fingers   • BPV (benign positional vertigo)    • Cervical back pain with evidence of disc disease    • Diabetes mellitus 2004   • Environmental allergies    • Eustachian tube disorder    • History of falling     related to low sodium after starting on new medications after hospitalization for afib   • History of head injury    • Hyperlipidemia    • Hypertension    • Hyponatremia " "2023    required hospitalization at [facility] after starting new medications for atrial fib   • Insomnia due to stress     recent hospital admissions   • Kidney stone    • Leg weakness 2023    due to low sodium from new medications related to atrial fibrillation   • Loose, teeth     lower teeth are loose   • Nausea     related to new medication   • Ovarian cancer 2004    surgery and chemotherapy   • Right knee pain      PSH:    Past Surgical History:   Procedure Laterality Date   • APPENDECTOMY     • CATARACT EXTRACTION WITH INTRAOCULAR LENS IMPLANT Bilateral    •  SECTION     • HYSTERECTOMY         Immunization History:  Influenza: No  Pneumococcal: UTD  Tetanus: UTD  Covid : x3    Social History:   Tobacco:   Social History     Tobacco Use   Smoking Status Never   • Passive exposure: Never   Smokeless Tobacco Never      Alcohol:     Social History     Substance and Sexual Activity   Alcohol Use No         Physical Exam:/80 (BP Location: Right arm, Patient Position: Lying)   Pulse 105   Temp 97.6 °F (36.4 °C) (Temporal)   Resp 16   Ht 158.8 cm (62.5\")   Wt 62.6 kg (138 lb)   SpO2 97%   BMI 24.84 kg/m²       General Appearance:    Alert, cooperative, no distress, appears stated age   Head:    Normocephalic, without obvious abnormality, atraumatic   Lungs:     Clear to auscultation bilaterally, respirations unlabored    Heart:   Regular rate and rhythm, S1 and S2 normal    Abdomen:    Soft without tenderness   Extremities:   Extremities normal, atraumatic, no cyanosis or edema   Skin:   Skin color, texture, turgor normal, no rashes or lesions   Neurologic:   Grossly intact     Results Review:     LABS:  Lab Results   Component Value Date    WBC 6.32 2017    HGB 13.0 2017    HCT 39.7 2017    MCV 92.1 2017     2017    NEUTROABS 5.38 2017    GLUCOSE 110 (H) 2017    BUN 12 2017    CREATININE 0.50 (L) 2017    EGFRIFNONA 120 2017 "     05/18/2017    K 3.6 05/18/2017     05/18/2017    CO2 28.0 05/18/2017    MG 1.9 04/02/2023    CALCIUM 9.8 05/18/2017    ALBUMIN 4.80 05/17/2017    AST 23 05/17/2017    ALT 14 05/17/2017    BILITOT 0.4 05/17/2017       RADIOLOGY:  4/1/23 CT abd:  CT Abdomen Pelvis Without Contrast  Order: [id number]  Impression    1. Extensive right renal stone disease with mild right hydronephrosis.   Largest stone ion right renal pelvis.   2. No obvious ureteral stone. No obvious renal mass.       This study was performed using dose reduction techniques to achieve   radiation exposure as low as reasonably achievable (ALARA)   Narrative    CT ABDOMEN AND PELVIS, WITHOUT CONTRAST     HISTORY: Hematuria, gross/macroscopic.     COMPARISON: None.     TECHNIQUE: Noncontrast exam.     CT ABDOMEN:  Numerous right renal stones are seen within calyces and   right renal pelvis. The largest is seen of the right renal pelvis   measuring up to 13 mm. There is mild right hydronephrosis. No left-sided   stone disease is present. No definite ureteral stone is seen. There is   no definite ureteral mass. Remaining solid organs are unremarkable. The   bowel shows no evidence of obstruction. There is no free air or free   fluid within the abdomen. Mild fecal impaction is noted.     CT PELVIS: No bowel dilatation is seen. There is no free fluid. Appendix   is not visualized. Patient is status post hysterectomy. Bladder is   unremarkable.      I reviewed the patient's new clinical results.    Cancer Staging (if applicable)  Cancer Patient: __ yes __no __unknown; If yes, clinical stage T:__ N:__M:__, stage group or __N/A      Impression: Nephrolithiasis       Plan: RIGHT URETEROSCOPY LASER LITHOTRIPSY  As stated above and I agree,  MD Tamara Mina, APRN   4/17/2023   12:46 EDT

## 2023-04-17 NOTE — ANESTHESIA PREPROCEDURE EVALUATION
Anesthesia Evaluation                  Airway   Mallampati: I  TM distance: >3 FB  Neck ROM: full  No difficulty expected  Dental      Comment: Loose lower teeth    Pulmonary    Cardiovascular     ECG reviewed    (+) hypertension, dysrhythmias Atrial Fib,     ROS comment: Stress ok    Neuro/Psych  GI/Hepatic/Renal/Endo    (+)   diabetes mellitus,     Musculoskeletal     Abdominal    Substance History      OB/GYN          Other   arthritis,    history of cancer                    Anesthesia Plan    ASA 3     general     intravenous induction     Anesthetic plan, risks, benefits, and alternatives have been provided, discussed and informed consent has been obtained with: patient.    Plan discussed with CRNA.        CODE STATUS:

## 2024-12-16 ENCOUNTER — PREP FOR SURGERY (OUTPATIENT)
Dept: OTHER | Facility: HOSPITAL | Age: 84
End: 2024-12-16
Payer: MEDICARE

## 2024-12-16 DIAGNOSIS — R19.5 FECES CONTENTS ABNORMAL: ICD-10-CM

## 2024-12-16 DIAGNOSIS — Z12.11 SPECIAL SCREENING FOR MALIGNANT NEOPLASMS, COLON: Primary | ICD-10-CM

## 2024-12-17 PROBLEM — Z12.11 SPECIAL SCREENING FOR MALIGNANT NEOPLASMS, COLON: Status: ACTIVE | Noted: 2024-12-16

## 2024-12-17 PROBLEM — R19.5 FECES CONTENTS ABNORMAL: Status: ACTIVE | Noted: 2024-12-16

## 2024-12-17 RX ORDER — SODIUM CHLORIDE 0.9 % (FLUSH) 0.9 %
10 SYRINGE (ML) INJECTION AS NEEDED
OUTPATIENT
Start: 2024-12-17

## 2024-12-17 RX ORDER — SODIUM CHLORIDE 9 MG/ML
40 INJECTION, SOLUTION INTRAVENOUS AS NEEDED
OUTPATIENT
Start: 2024-12-17

## 2024-12-17 RX ORDER — DEXTROSE, SODIUM CHLORIDE, SODIUM LACTATE, POTASSIUM CHLORIDE, AND CALCIUM CHLORIDE 5; .6; .31; .03; .02 G/100ML; G/100ML; G/100ML; G/100ML; G/100ML
100 INJECTION, SOLUTION INTRAVENOUS CONTINUOUS
OUTPATIENT
Start: 2024-12-17 | End: 2024-12-17

## 2024-12-17 RX ORDER — SODIUM CHLORIDE 0.9 % (FLUSH) 0.9 %
3 SYRINGE (ML) INJECTION EVERY 12 HOURS SCHEDULED
OUTPATIENT
Start: 2024-12-17

## 2024-12-18 ENCOUNTER — TELEPHONE (OUTPATIENT)
Dept: GASTROENTEROLOGY | Facility: CLINIC | Age: 84
End: 2024-12-18
Payer: MEDICARE

## 2024-12-18 NOTE — TELEPHONE ENCOUNTER
PATIENT IS CHECKING THE STATUS OF REFERRAL. I INFORMED PATIENT ORDER WAS PLACED AND SHE WOULD RECEIVE A PHONE CALL WITH  MORE DETAILS FROM .

## 2024-12-18 NOTE — TELEPHONE ENCOUNTER
Returned pt call  to schedule Hp case. Informed pt will send letter with instructions we reviewed. Will send clearance for permission to hold xarelto.

## 2025-01-13 ENCOUNTER — TELEPHONE (OUTPATIENT)
Dept: GASTROENTEROLOGY | Facility: CLINIC | Age: 85
End: 2025-01-13
Payer: MEDICARE

## 2025-01-15 RX ORDER — SODIUM, POTASSIUM,MAG SULFATES 17.5-3.13G
SOLUTION, RECONSTITUTED, ORAL ORAL
Qty: 354 ML | Refills: 0 | Status: SHIPPED | OUTPATIENT
Start: 2025-01-15

## 2025-01-22 ENCOUNTER — PRE-ADMISSION TESTING (OUTPATIENT)
Dept: PREADMISSION TESTING | Facility: HOSPITAL | Age: 85
End: 2025-01-22
Payer: MEDICARE

## 2025-01-22 VITALS — BODY MASS INDEX: 25.51 KG/M2 | WEIGHT: 143.96 LBS | HEIGHT: 63 IN

## 2025-01-22 LAB
DEPRECATED RDW RBC AUTO: 40.9 FL (ref 37–54)
ERYTHROCYTE [DISTWIDTH] IN BLOOD BY AUTOMATED COUNT: 12.5 % (ref 12.3–15.4)
HCT VFR BLD AUTO: 43 % (ref 34–46.6)
HGB BLD-MCNC: 14.5 G/DL (ref 12–15.9)
INR PPP: 1.18 (ref 0.89–1.12)
MCH RBC QN AUTO: 30 PG (ref 26.6–33)
MCHC RBC AUTO-ENTMCNC: 33.7 G/DL (ref 31.5–35.7)
MCV RBC AUTO: 89 FL (ref 79–97)
PLATELET # BLD AUTO: 243 10*3/MM3 (ref 140–450)
PMV BLD AUTO: 10.2 FL (ref 6–12)
POTASSIUM SERPL-SCNC: 4.4 MMOL/L (ref 3.5–5.2)
PROTHROMBIN TIME: 15.1 SECONDS (ref 12.2–14.5)
QT INTERVAL: 394 MS
QTC INTERVAL: 460 MS
RBC # BLD AUTO: 4.83 10*6/MM3 (ref 3.77–5.28)
WBC NRBC COR # BLD AUTO: 7.81 10*3/MM3 (ref 3.4–10.8)

## 2025-01-22 PROCEDURE — 84132 ASSAY OF SERUM POTASSIUM: CPT

## 2025-01-22 PROCEDURE — 85027 COMPLETE CBC AUTOMATED: CPT

## 2025-01-22 PROCEDURE — 36415 COLL VENOUS BLD VENIPUNCTURE: CPT

## 2025-01-22 PROCEDURE — 93005 ELECTROCARDIOGRAM TRACING: CPT

## 2025-01-22 PROCEDURE — 85610 PROTHROMBIN TIME: CPT

## 2025-01-22 RX ORDER — METOPROLOL TARTRATE 25 MG/1
25 TABLET, FILM COATED ORAL NIGHTLY
COMMUNITY

## 2025-01-22 NOTE — PAT
Patient did not review general PAT education video as instructed in their preoperative information received from their surgeon.  One-on-one Pre Admission Testing general education provided during PAT visit.  Copies of PAT general education handouts (Incentive Spirometry, Meds to Beds Program, Patient Belongings, Pre-op skin preparation instructions, Blood Glucose testing, Visitor policy, Surgery FAQ, Code H) distributed to patient. Encouraged patient/family to read PAT general education handouts thoroughly and notify PAT staff with any questions or concerns. Patient instructed to bring PAT pass and completed skin prep sheet (if applicable) on the day of procedure. Patient verbalized understanding of all information and priority content.     An arrival time for procedure was not provided during PAT visit. If patient had any questions or concerns about their arrival time, they were instructed to contact their surgeon/physician.  Additionally, if the patient referred to an arrival time that was acquired from their my chart account, patient was encouraged to verify that time with their surgeon/physician. Arrival times are NOT provided in Pre Admission Testing Department.    Pt aware of Xarelto instructions from Dr. Ryder's letter.

## 2025-01-29 ENCOUNTER — ANESTHESIA EVENT (OUTPATIENT)
Dept: GASTROENTEROLOGY | Facility: HOSPITAL | Age: 85
End: 2025-01-29
Payer: MEDICARE

## 2025-01-29 ENCOUNTER — ANESTHESIA (OUTPATIENT)
Dept: GASTROENTEROLOGY | Facility: HOSPITAL | Age: 85
End: 2025-01-29
Payer: MEDICARE

## 2025-01-29 ENCOUNTER — HOSPITAL ENCOUNTER (OUTPATIENT)
Facility: HOSPITAL | Age: 85
Setting detail: HOSPITAL OUTPATIENT SURGERY
Discharge: HOME | End: 2025-01-29
Attending: INTERNAL MEDICINE | Admitting: INTERNAL MEDICINE
Payer: MEDICARE

## 2025-01-29 VITALS
WEIGHT: 143 LBS | DIASTOLIC BLOOD PRESSURE: 66 MMHG | OXYGEN SATURATION: 92 % | RESPIRATION RATE: 20 BRPM | SYSTOLIC BLOOD PRESSURE: 114 MMHG | TEMPERATURE: 98.8 F | BODY MASS INDEX: 25.34 KG/M2 | HEIGHT: 63 IN | HEART RATE: 112 BPM

## 2025-01-29 DIAGNOSIS — R19.5 FECES CONTENTS ABNORMAL: ICD-10-CM

## 2025-01-29 DIAGNOSIS — Z12.11 SPECIAL SCREENING FOR MALIGNANT NEOPLASMS, COLON: ICD-10-CM

## 2025-01-29 LAB — GLUCOSE BLDC GLUCOMTR-MCNC: 154 MG/DL (ref 70–130)

## 2025-01-29 PROCEDURE — 25010000002 PHENYLEPHRINE 10 MG/ML SOLUTION: Performed by: NURSE ANESTHETIST, CERTIFIED REGISTERED

## 2025-01-29 PROCEDURE — 25010000002 ONDANSETRON PER 1 MG: Performed by: NURSE ANESTHETIST, CERTIFIED REGISTERED

## 2025-01-29 PROCEDURE — 25010000002 LIDOCAINE PF 1% 1 % SOLUTION: Performed by: NURSE ANESTHETIST, CERTIFIED REGISTERED

## 2025-01-29 PROCEDURE — 25810000003 SODIUM CHLORIDE 0.9 % SOLUTION 1,000 ML FLEX CONT: Performed by: INTERNAL MEDICINE

## 2025-01-29 PROCEDURE — 25010000002 PROPOFOL 10 MG/ML EMULSION: Performed by: NURSE ANESTHETIST, CERTIFIED REGISTERED

## 2025-01-29 PROCEDURE — 88305 TISSUE EXAM BY PATHOLOGIST: CPT | Performed by: INTERNAL MEDICINE

## 2025-01-29 PROCEDURE — 45385 COLONOSCOPY W/LESION REMOVAL: CPT | Performed by: INTERNAL MEDICINE

## 2025-01-29 PROCEDURE — 82948 REAGENT STRIP/BLOOD GLUCOSE: CPT

## 2025-01-29 PROCEDURE — 25810000003 SODIUM CHLORIDE 0.9 % SOLUTION: Performed by: NURSE ANESTHETIST, CERTIFIED REGISTERED

## 2025-01-29 RX ORDER — ONDANSETRON 2 MG/ML
4 INJECTION INTRAMUSCULAR; INTRAVENOUS ONCE AS NEEDED
Status: COMPLETED | OUTPATIENT
Start: 2025-01-29 | End: 2025-01-29

## 2025-01-29 RX ORDER — LIDOCAINE HYDROCHLORIDE 10 MG/ML
0.5 INJECTION, SOLUTION EPIDURAL; INFILTRATION; INTRACAUDAL; PERINEURAL ONCE AS NEEDED
Status: DISCONTINUED | OUTPATIENT
Start: 2025-01-29 | End: 2025-02-04 | Stop reason: HOSPADM

## 2025-01-29 RX ORDER — FAMOTIDINE 10 MG/ML
20 INJECTION, SOLUTION INTRAVENOUS ONCE
Status: COMPLETED | OUTPATIENT
Start: 2025-01-29 | End: 2025-01-29

## 2025-01-29 RX ORDER — SODIUM CHLORIDE 0.9 % (FLUSH) 0.9 %
3 SYRINGE (ML) INJECTION EVERY 12 HOURS SCHEDULED
Status: DISCONTINUED | OUTPATIENT
Start: 2025-01-29 | End: 2025-02-04 | Stop reason: HOSPADM

## 2025-01-29 RX ORDER — PHENYLEPHRINE HYDROCHLORIDE 10 MG/ML
INJECTION INTRAVENOUS AS NEEDED
Status: DISCONTINUED | OUTPATIENT
Start: 2025-01-29 | End: 2025-01-29 | Stop reason: SURG

## 2025-01-29 RX ORDER — SODIUM CHLORIDE 9 MG/ML
40 INJECTION, SOLUTION INTRAVENOUS AS NEEDED
Status: DISCONTINUED | OUTPATIENT
Start: 2025-01-29 | End: 2025-02-04 | Stop reason: HOSPADM

## 2025-01-29 RX ORDER — SODIUM CHLORIDE 0.9 % (FLUSH) 0.9 %
10 SYRINGE (ML) INJECTION EVERY 12 HOURS SCHEDULED
Status: DISCONTINUED | OUTPATIENT
Start: 2025-01-29 | End: 2025-02-04 | Stop reason: HOSPADM

## 2025-01-29 RX ORDER — MIDAZOLAM HYDROCHLORIDE 1 MG/ML
0.5 INJECTION, SOLUTION INTRAMUSCULAR; INTRAVENOUS
Status: DISCONTINUED | OUTPATIENT
Start: 2025-01-29 | End: 2025-02-04 | Stop reason: HOSPADM

## 2025-01-29 RX ORDER — SODIUM CHLORIDE 9 MG/ML
INJECTION, SOLUTION INTRAVENOUS CONTINUOUS PRN
Status: DISCONTINUED | OUTPATIENT
Start: 2025-01-29 | End: 2025-01-29 | Stop reason: SURG

## 2025-01-29 RX ORDER — SODIUM CHLORIDE 0.9 % (FLUSH) 0.9 %
10 SYRINGE (ML) INJECTION AS NEEDED
Status: DISCONTINUED | OUTPATIENT
Start: 2025-01-29 | End: 2025-02-04 | Stop reason: HOSPADM

## 2025-01-29 RX ORDER — SODIUM CHLORIDE, SODIUM LACTATE, POTASSIUM CHLORIDE, CALCIUM CHLORIDE 600; 310; 30; 20 MG/100ML; MG/100ML; MG/100ML; MG/100ML
9 INJECTION, SOLUTION INTRAVENOUS CONTINUOUS
Status: DISCONTINUED | OUTPATIENT
Start: 2025-01-30 | End: 2025-01-30 | Stop reason: HOSPADM

## 2025-01-29 RX ORDER — IPRATROPIUM BROMIDE AND ALBUTEROL SULFATE 2.5; .5 MG/3ML; MG/3ML
3 SOLUTION RESPIRATORY (INHALATION) ONCE AS NEEDED
Status: DISCONTINUED | OUTPATIENT
Start: 2025-01-29 | End: 2025-02-04 | Stop reason: HOSPADM

## 2025-01-29 RX ORDER — LIDOCAINE HYDROCHLORIDE 10 MG/ML
INJECTION, SOLUTION EPIDURAL; INFILTRATION; INTRACAUDAL; PERINEURAL AS NEEDED
Status: DISCONTINUED | OUTPATIENT
Start: 2025-01-29 | End: 2025-01-29 | Stop reason: SURG

## 2025-01-29 RX ORDER — FAMOTIDINE 20 MG/1
20 TABLET, FILM COATED ORAL ONCE
Status: DISCONTINUED | OUTPATIENT
Start: 2025-01-29 | End: 2025-02-04 | Stop reason: HOSPADM

## 2025-01-29 RX ORDER — DEXTROSE, SODIUM CHLORIDE, SODIUM LACTATE, POTASSIUM CHLORIDE, AND CALCIUM CHLORIDE 5; .6; .31; .03; .02 G/100ML; G/100ML; G/100ML; G/100ML; G/100ML
100 INJECTION, SOLUTION INTRAVENOUS CONTINUOUS
Status: DISCONTINUED | OUTPATIENT
Start: 2025-01-29 | End: 2025-01-29 | Stop reason: HOSPADM

## 2025-01-29 RX ORDER — PROPOFOL 10 MG/ML
VIAL (ML) INTRAVENOUS AS NEEDED
Status: DISCONTINUED | OUTPATIENT
Start: 2025-01-29 | End: 2025-01-29 | Stop reason: SURG

## 2025-01-29 RX ADMIN — PROPOFOL INJECTABLE EMULSION 80 MG: 10 INJECTION, EMULSION INTRAVENOUS at 15:06

## 2025-01-29 RX ADMIN — SODIUM CHLORIDE: 9 INJECTION, SOLUTION INTRAVENOUS at 15:04

## 2025-01-29 RX ADMIN — SODIUM CHLORIDE 40 ML: 9 INJECTION, SOLUTION INTRAVENOUS at 14:47

## 2025-01-29 RX ADMIN — PROPOFOL INJECTABLE EMULSION 100 MCG/KG/MIN: 10 INJECTION, EMULSION INTRAVENOUS at 15:08

## 2025-01-29 RX ADMIN — PHENYLEPHRINE HYDROCHLORIDE 100 MCG: 10 INJECTION INTRAVENOUS at 15:10

## 2025-01-29 RX ADMIN — ONDANSETRON 4 MG: 2 INJECTION INTRAMUSCULAR; INTRAVENOUS at 16:10

## 2025-01-29 RX ADMIN — FAMOTIDINE 20 MG: 10 INJECTION, SOLUTION INTRAVENOUS at 14:47

## 2025-01-29 RX ADMIN — LIDOCAINE HYDROCHLORIDE 50 MG: 10 INJECTION, SOLUTION EPIDURAL; INFILTRATION; INTRACAUDAL; PERINEURAL at 15:06

## 2025-01-29 NOTE — ANESTHESIA PREPROCEDURE EVALUATION
Anesthesia Evaluation                  Airway   Mallampati: I  TM distance: >3 FB  Neck ROM: full  No difficulty expected  Dental      Pulmonary    Cardiovascular     ECG reviewed    (+) hypertension, dysrhythmias Atrial Fib, hyperlipidemia      Neuro/Psych  GI/Hepatic/Renal/Endo    (+) renal disease-, diabetes mellitus    Musculoskeletal     (+) neck pain  Abdominal    Substance History      OB/GYN          Other   arthritis,   history of cancer                Anesthesia Plan    ASA 4     general     intravenous induction     Anesthetic plan, risks, benefits, and alternatives have been provided, discussed and informed consent has been obtained with: patient.    Plan discussed with CRNA.    CODE STATUS:

## 2025-01-29 NOTE — H&P
Gastroenterology Consult Note    Reason for Consultation: Screening    Patient Care Team:  Lam Gonzalez MD as PCP - General (Family Medicine)    Chief complaint: Screening      History of present illness: The patient is an 84-year-old who had a positive Cologuard test.  She has never had a previous colonoscopy.  She has no family history of colon polyps or cancers.  She has been on Xarelto but has been off it for at least 48 hours.    Allergies   Allergen Reactions    Azithromycin Shortness Of Breath    Contrast Dye (Echo Or Unknown Ct/Mr) Hives and Shortness Of Breath    Iodinated Contrast Media Anaphylaxis    Propafenone Other (See Comments)     Lowered sodium      Gluten Meal Unknown - Low Severity    Cefdinir GI Intolerance    Ciprofloxacin Rash    Covid-19 Mrna Vacc (Moderna) Myalgia    Doxycycline Rash    Influenza Vaccines Swelling, Mental Status Change and Dizziness    Levofloxacin Rash    Sulfa Antibiotics Rash     Prior to Admission medications    Medication Sig Start Date End Date Taking? Authorizing Provider   Calcium Carb-Cholecalciferol 600-20 MG-MCG chewable tablet Chew 1 tablet Daily.   Yes Sania Amezcua MD   dilTIAZem CD (CARDIZEM CD) 180 MG 24 hr capsule Take 1 capsule by mouth Daily. 4/10/23  Yes Sania Amezcua MD   docusate sodium 100 MG capsule Apply 2 tablets to the mouth or throat Every Night.   Yes Sania Amezcua MD   glyBURIDE (DIAbeta) 1.25 MG tablet Take 1 tablet by mouth 2 (Two) Times a Day With Meals.   Yes Sania Amezcua MD   magnesium oxide 250 MG tablet Take 1 tablet by mouth.   Yes Sania Amezcua MD   metoprolol tartrate (LOPRESSOR) 25 MG tablet Take 1 tablet by mouth Every Night.   Yes Sania Amezcua MD   fluticasone (FLONASE) 50 MCG/ACT nasal spray Administer 2 sprays into the nostril(s) as directed by provider Daily.    Sania Amezcua MD   Glucosamine-Chondroitin (MOVE FREE PO) Take 1 tablet by mouth 2 (Two) Times a Day.     Sania Amezcua MD   meclizine (ANTIVERT) 25 MG tablet Take 1 tablet by mouth 3 (Three) Times a Day As Needed for Dizziness.    Sania Amezcua MD   melatonin 5 MG tablet tablet Take 1 tablet by mouth At Night As Needed.    Sania Amezcua MD   multivitamin with minerals (OCUVITE PO) Take 1 tablet by mouth Daily.    Sania Amezcua MD   multivitamin with minerals tablet tablet Take 1 tablet by mouth Daily.    Sania Amezcua MD   Omega-3 Fatty Acids (FISH OIL) 1200 MG capsule capsule Take 1 capsule by mouth Every Night.    Sania Amezcua MD   ondansetron (ZOFRAN) 4 MG tablet Take 1 tablet by mouth 3 (Three) Times a Day. 4/4/23   Sania Amezcua MD   Polyethylene Glycol 3350 (MIRALAX PO) Take 1 dose by mouth Daily As Needed.    Sania Amezcua MD   PreviDent 5000 Booster Plus 1.1 % paste APPLY A THIN RIBBON TO BRUSH. BRUSH FOR 2 MINUTES AT BEDTIME. SPIT, BUT DO NOT RINSE. 1/19/23   Sania Amezcua MD   Probiotic Product (ALIGN PO) Take 1 tablet by mouth Daily.    Sania Amezcua MD   sodium-potassium-magnesium sulfates (Suprep Bowel Prep Kit) 17.5-3.13-1.6 GM/177ML solution oral solution Use as directed by provider for colonoscopy prep 1/15/25   David Ryder MD   triamcinolone (KENALOG) 0.1 % cream Insert 2 Applications into the vagina 2 (Two) Times a Week.    Sania Amezcua MD   Xarelto 20 MG tablet TAKE 1 TABLET (20 MG TOTAL) BY MOUTH IN THE MORNING 4/2/23   Sania Amezcua MD      Current Facility-Administered Medications   Medication Dose Route Frequency Provider Last Rate Last Admin    dextrose 5 % and lactated Ringer's infusion  100 mL/hr Intravenous Continuous David Ryder MD        famotidine (PEPCID) tablet 20 mg  20 mg Oral Once Danny Coon MD        [START ON 1/30/2025] lactated ringers infusion  9 mL/hr Intravenous Continuous Danny Coon MD        lidocaine PF 1% (XYLOCAINE) injection 0.5 mL  0.5 mL  "Injection Once PRN Danny Coon MD        midazolam (VERSED) injection 0.5 mg  0.5 mg Intravenous Q10 Min PRN Danny Coon MD        sodium chloride 0.9 % flush 10 mL  10 mL Intravenous Q12H Danny Coon MD        sodium chloride 0.9 % flush 10 mL  10 mL Intravenous PRN Danny Coon MD        sodium chloride 0.9 % flush 10 mL  10 mL Intravenous PRN David Ryder MD        sodium chloride 0.9 % flush 3 mL  3 mL Intravenous Q12H David Ryder MD        sodium chloride 0.9 % infusion 40 mL  40 mL Intravenous PRN David Ryder MD   40 mL at 25 1447      Past Medical History:   Diagnosis Date    Adverse reaction to COVID-19 vaccine     1st and 2nd dose caused myalgia; 3rd dose caused \"gas around heart\"; increased belching    AF (atrial fibrillation) 2023    recent hospitalization at [facility]    Arthritis     fingers    BPV (benign positional vertigo)     Cervical back pain with evidence of disc disease     Diabetes mellitus     Environmental allergies     Eustachian tube disorder     History of falling     related to low sodium after starting on new medications after hospitalization for afib    History of head injury     Hyperlipidemia     Hypertension     Hyponatremia 2023    required hospitalization at [facility] after starting new medications for atrial fib    Insomnia due to stress     recent hospital admissions    Kidney stone     Kidney stones     Leg weakness 2023    due to low sodium from new medications related to atrial fibrillation    Loose, teeth     lower teeth are loose    Nausea     related to new medication    Ovarian cancer 2004    surgery and chemotherapy    Right knee pain      Past Surgical History:   Procedure Laterality Date    APPENDECTOMY      CATARACT EXTRACTION WITH INTRAOCULAR LENS IMPLANT Bilateral      SECTION      CYSTOSCOPY URETEROSCOPY Right 2023    Procedure: URETEROSCOPY LASER LITHOTRIPSY, STONE BASKET EXTRACTION, " RIGHT URETERAL STENT PLACEMENT;  Surgeon: Jay Claros MD;  [facility];  Service: Urology;  Laterality: Right;    HYSTERECTOMY  2004     History reviewed. No pertinent family history.  GI Family History  No family history of colon polyps or cancers  Social History     Tobacco Use   Smoking Status Never    Passive exposure: Never   Smokeless Tobacco Never     Social History     Substance and Sexual Activity   Alcohol Use No      Social History     Substance and Sexual Activity   Drug Use No        ------  Review of systems:   Please refer to review of systems and pertinent positives in the history of present illness, all other groups tested are negative.    Vital Signs   Temp:  [97 °F (36.1 °C)] 97 °F (36.1 °C)  Heart Rate:  [95] 95  Resp:  [18] 18  BP: (132)/(79) 132/79    Physical Exam:   General Appearance: Alert, in no acute distress  Head: Normocephalic, without obvious abnormality, atraumatic  Eyes: Lids and lashes normal, conjunctivae and sclerae normal  Ears: Ears appear intact with no abnormalities noted  Neck: No adenopathy, supple, trachea midline, no thyromegaly, no JVD  Lungs: respirations regular, even and unlabored Heart: Regular rhythm and normal rate  Chest Wall: Symmetrical respiratory expansion  Abdomen: No masses, no organomegaly, soft nontender, nondistended, no guarding, no rebound tenderness  Extremities:  Moves all extremities well, no edema, no cyanosis, no redness  Skin: No bleeding, bruising or rash  Neurologic: Cranial nerves 2 - 12 grossly intact, no focal deficits       LABS:   Lab Results (last 48 hours)       Procedure Component Value Units Date/Time    POC Glucose Once [[id number]]  (Abnormal) Collected: 01/29/25 1440    Specimen: Blood Updated: 01/29/25 1443     Glucose 154 mg/dL           RADIOLOGY:  Imaging Results (Last 24 Hours)       ** No results found for the last 24 hours. **            Assessment & Plan       Special screening for malignant neoplasms, colon     Feces contents abnormal      Impressions and plan #1 screening colonoscopy: She had a positive Cologuard as well.  We will plan colonoscopy to evaluate and remove any polyps that may be present.  Risks, benefits, alternatives were explained to detail.    I discussed the patient's findings and my recommendations with patient    David Ryder MD  01/29/25  14:49 EST

## 2025-01-29 NOTE — OP NOTE
Colonoscopy with polypectomy    Instrument: Pediatric colonoscope    Medications: As per anesthesia      Preop diagnosis: Screening and positive Cologuard    Postop diagnosis: 2 diminutive polyps and diverticulosis    Indication occasions: The patient is a an 84-year-old has never had a previous colonoscopy.  A Cologuard was performed and was positive.    She has no family history of colon polyps or cancers.    Procedure: After the patient was informed of the risks, benefits, alternatives to the procedure, informed consent was obtained.  Endoscope was inserted into the rectum and advanced to the cecum.  There was some coating in the cecum.  No polyps were seen although there was a diminutive 3 mm polyp on the ileocecal valve biopsied and removed with a cold biopsy forceps.  There was diverticuli in the right colon transverse colon descending colon and sigmoid colon.  There was a polyp about 4 mm in size in the sigmoid colon at 25 cm.  This was snared and removed with a cold snare.  The patient tolerated the procedure well.  Retroflexion was performed.  A second pass was made to the cecum.  There was no bleeding.        Impressions and plan #1 diminutive polyp at the ileocecal valve removed    #2 diminutive polyp in the sigmoid removed    #3 pandiverticulosis    Plan follow-up as needed, we will review the pathology.    CC referring Lam Zamora MD

## 2025-01-31 LAB
CYTO UR: NORMAL
LAB AP CASE REPORT: NORMAL
LAB AP CLINICAL INFORMATION: NORMAL
PATH REPORT.FINAL DX SPEC: NORMAL
PATH REPORT.GROSS SPEC: NORMAL

## 2025-05-29 NOTE — ANESTHESIA POSTPROCEDURE EVALUATION
Patient: Susanne Lim    Procedure Summary       Date: 01/29/25 Room / Location:  [facility]    Anesthesia Start: 1502 Anesthesia Stop: 1533    Procedure: COLONOSCOPY Diagnosis:       Special screening for malignant neoplasms, colon      Feces contents abnormal      (Special screening for malignant neoplasms, colon [Z12.11])      (Feces contents abnormal [R19.5])    Surgeons: David Ryder MD Provider: Danny Coon MD    Anesthesia Type: general ASA Status: 4            Anesthesia Type: general    Vitals  Vitals Value Taken Time   /66 01/29/25 16:00   Temp 98.8 °F (37.1 °C) 01/29/25 16:00   Pulse 112 01/29/25 16:05   Resp 20 01/29/25 16:00   SpO2 92 % 01/29/25 16:05           Post Anesthesia Care and Evaluation    Patient location during evaluation: PACU  Patient participation: complete - patient participated  Level of consciousness: awake and alert  Pain management: adequate    Airway patency: patent  Anesthetic complications: No anesthetic complications  PONV Status: none  Cardiovascular status: hemodynamically stable and acceptable  Respiratory status: nonlabored ventilation, acceptable and nasal cannula  Hydration status: acceptable

## (undated) DEVICE — SOLIDIFIER LIQ PREMISORB 1500CC

## (undated) DEVICE — HYBRID CO2 TUBING/CAP SET FOR OLYMPUS® SCOPES & CO2 SOURCE: Brand: ERBE

## (undated) DEVICE — SNAR POLYP CAPTIVATOR/COLD STFF RND 10MM 240CM

## (undated) DEVICE — SAFELINER SUCTION CANISTER 1000CC: Brand: DEROYAL

## (undated) DEVICE — BLANKT WARM UPPR/BDY ARM/OUT 57X196CM

## (undated) DEVICE — AIR/WATER CLEANING VALVES: Brand: AIR/WATER CLEANING VALVES

## (undated) DEVICE — LUBE JELLY FOIL PACK 1.4 OZ: Brand: MEDLINE INDUSTRIES, INC.

## (undated) DEVICE — NITINOL STONE RETRIEVAL BASKET: Brand: ZERO TIP

## (undated) DEVICE — KT ORCA ORCAPOD DISP STRL

## (undated) DEVICE — GW PTFE FIXCORE STFF J/3MM .035 150CM

## (undated) DEVICE — FIRST STEP BEDSIDE ADD WATER KIT - RESEALABLE STAND-UP POUCH, ENDOSCOPIC CLEANING PAD - 1 POUCH: Brand: FIRST STEP BEDSIDE ADD WATER KIT - RESEALABLE STAND-UP POUCH, ENDOSCOPIC CLEANIN

## (undated) DEVICE — CONTN GRAD MEAS TRIANG 32OZ BLK

## (undated) DEVICE — ST LINER SAFECAP GRN RED CP STRL

## (undated) DEVICE — PK CYSTO-TUR BASIC 10

## (undated) DEVICE — TUBING, SUCTION, 1/4" X 10', STRAIGHT: Brand: MEDLINE

## (undated) DEVICE — SYR LUERLOK 50ML

## (undated) DEVICE — INTRO ACCSR BLNT TP

## (undated) DEVICE — FIBR LASR SOLTIVE BALL/TP 200MICRON 1P/U

## (undated) DEVICE — GLV SURG SENSICARE PI MIC PF SZ8 LF STRL

## (undated) DEVICE — NITINOL WIRE WITH HYDROPHILIC TIP: Brand: SENSOR

## (undated) DEVICE — SOL IRR H2O BO 1000ML STRL